# Patient Record
Sex: FEMALE | Race: BLACK OR AFRICAN AMERICAN | Employment: FULL TIME | URBAN - METROPOLITAN AREA
[De-identification: names, ages, dates, MRNs, and addresses within clinical notes are randomized per-mention and may not be internally consistent; named-entity substitution may affect disease eponyms.]

---

## 2019-02-07 ENCOUNTER — HOSPITAL ENCOUNTER (OUTPATIENT)
Dept: DIABETES SERVICES | Age: 54
Discharge: HOME | End: 2019-02-07
Payer: COMMERCIAL

## 2019-02-07 DIAGNOSIS — E11.9 TYPE 2 DIABETES MELLITUS WITHOUT COMPLICATION, UNSPECIFIED WHETHER LONG TERM INSULIN USE (HCC): ICD-10-CM

## 2019-02-07 PROCEDURE — G0109 DIAB MANAGE TRN IND/GROUP: HCPCS | Performed by: DIETITIAN, REGISTERED

## 2019-02-08 NOTE — DIABETES MGMT
2/7/2019 Dear Jolene Muniz NP, Thank you for your kind referral. Your patient, Paulette Ocampo, attended Session #1 at [facility] where the following topics were covered today: 
*Describing diabetes disease process and treatment options *Incorporating nutrition management into their lifestyle *Monitoring blood glucose and other parameters and interpreting and using the results for self  
management decision making *Preventing, detecting and treating acute complications *Incorporating physical activity into lifestyle *Using medications safely and for maximum therapeutic effectiveness * Developing personal strategies to promote health and behavior change *Developing personal strategies to address psychosocial issues and concerns Data from first visit: 
Weight: 2/7/2019 234.4# HgbA1c: 1/11/19 6.9 % <<- addendum, previous a1c of 9.7% in error. Increased risk for diabetes: 5.7-6.4%, Diabetes >6.4% Glycemic control for adults with diabetes: < 7% Elderly or multiple medical conditions <8% Random blood glucose: 2/7/2019 Pre-Lunch 113 mg/dl Meter given: Pt had own meter: One Touch Ultra 2 Goal(s) set : Goal 1: Walking 15 minutes and dance class 3-4x/week. Your patient will have two (2) additional appointments to complete the ordered education. Their next visit is scheduled for 02/21/19. We look forward to assisting your patient in meeting their self-management goals. If you have any questions, please do not hesitate to call the [facility] at [phone]. Sincerely, Rl Allen RD [address], [address] Phone: [phone] Fax: [phone]

## 2019-02-21 ENCOUNTER — HOSPITAL ENCOUNTER (OUTPATIENT)
Dept: DIABETES SERVICES | Age: 54
Discharge: HOME | End: 2019-02-21
Payer: COMMERCIAL

## 2019-02-21 DIAGNOSIS — E11.9 TYPE 2 DIABETES MELLITUS WITHOUT COMPLICATION, UNSPECIFIED WHETHER LONG TERM INSULIN USE (HCC): ICD-10-CM

## 2019-02-21 PROCEDURE — G0109 DIAB MANAGE TRN IND/GROUP: HCPCS | Performed by: DIETITIAN, REGISTERED

## 2019-03-21 ENCOUNTER — HOSPITAL ENCOUNTER (OUTPATIENT)
Dept: DIABETES SERVICES | Age: 54
Discharge: HOME | End: 2019-03-21
Payer: COMMERCIAL

## 2019-03-21 DIAGNOSIS — E11.9 TYPE 2 DIABETES MELLITUS WITHOUT COMPLICATION, UNSPECIFIED WHETHER LONG TERM INSULIN USE (HCC): ICD-10-CM

## 2019-03-21 PROCEDURE — G0109 DIAB MANAGE TRN IND/GROUP: HCPCS | Performed by: DIETITIAN, REGISTERED

## 2019-03-25 NOTE — DIABETES MGMT
3/21/2019 Dear Kings Earl NP, Thank you for your kind referral. Your patient, Sela Lennox, has completed his/her personal initial comprehensive education plan. The education plan included the following topics: Healthy Eating, Being Active, Taking Medicines, Monitoring, Reducing Risks, Healthy Coping and Problem Solving. Data from visit: 
Weight: 2/7/2019 234.4#; 3/21/2019 227.8 # HgbA1c: 1/11/2019 6.9%, 3/21/2019 unable to calculate- patient reports anemic Increased risk for diabetes: 5.7-6.4 %, Diabetes: >6.4% Glycemic control for adults with diabetes: <7% Elderly or multiple medical conditions: <8% Your patient continued the following goal(s) from their first class: Goal 1: Walking 15 minutes and dance class 3-4x/week. Education Learning Outcomes for All Education Topics(see above): Demonstrated Competency Your patient has been invited to attend Class 4 six months from now to continue learning about their diabetes. This class will focus on heart healthy eating. If you have any questions, please do not hesitate to call the [facility] at [phone]. Sincerely, Taylor Heard RD, CDE [facility] [address], [address] Phone: [phone] Fax: [phone]